# Patient Record
Sex: FEMALE | URBAN - METROPOLITAN AREA
[De-identification: names, ages, dates, MRNs, and addresses within clinical notes are randomized per-mention and may not be internally consistent; named-entity substitution may affect disease eponyms.]

---

## 2017-02-07 ENCOUNTER — IMPORTED ENCOUNTER (OUTPATIENT)
Dept: URBAN - METROPOLITAN AREA CLINIC 1 | Facility: CLINIC | Age: 82
End: 2017-02-07

## 2017-02-07 PROBLEM — H20.9: Noted: 2017-02-07

## 2017-02-07 PROCEDURE — 92012 INTRM OPH EXAM EST PATIENT: CPT

## 2017-02-07 NOTE — PATIENT DISCUSSION
1. Idiopathic Uveitis recurrent but improved on current meds: No previous systemic testing performed. Patient has seen Dr. Karoline Montiel in the past but never followed back up. D/c Latanoprost which could exacerbate inflammatory component begin Pred Forte Qdaily OD and begin Alphagan P BID OU. (rx sent to patient's Pharmacy)  2. Open Angle Glaucoma OU -(Adv OS/ Mod OD) (CD.5 OD/.75 OS) Will d/c Latanoprost and begin Alphagan P BID OU. IOP in better control than last visit. Return for an appointment in 3 week 10 dfe with Dr. Elyssa Guadalupe.

## 2017-02-27 ENCOUNTER — IMPORTED ENCOUNTER (OUTPATIENT)
Dept: URBAN - METROPOLITAN AREA CLINIC 1 | Facility: CLINIC | Age: 82
End: 2017-02-27

## 2017-02-27 PROBLEM — H40.1123: Noted: 2017-02-27

## 2017-02-27 PROBLEM — H40.1112: Noted: 2017-02-27

## 2017-02-27 PROCEDURE — 92012 INTRM OPH EXAM EST PATIENT: CPT

## 2017-02-27 NOTE — PATIENT DISCUSSION
1.  Open Angle Glaucoma OU (Adv OS/ Mod OD) (CD.5 OD/.75 OS)- Improved IOP OU on new regiment. Continue Alphagan P BID OU. 2.  Idiopathic Uveitis: Currently quiet. No previous systemic testing performed. Patient has seen Dr. Deni Glasgow in the past but never followed back up. Continue Pred Forte Qdaily OD and Alphagan P BID OU. 3.  Return in 6 months for a 30 w/ PMG.

## 2018-12-28 ENCOUNTER — IMPORTED ENCOUNTER (OUTPATIENT)
Dept: URBAN - METROPOLITAN AREA CLINIC 1 | Facility: CLINIC | Age: 83
End: 2018-12-28

## 2018-12-28 PROBLEM — H40.1112: Noted: 2018-12-28

## 2018-12-28 PROBLEM — H40.1123: Noted: 2018-12-28

## 2018-12-28 PROBLEM — H43.812: Noted: 2018-12-28

## 2018-12-28 PROBLEM — G45.9: Noted: 2018-12-28

## 2018-12-28 PROCEDURE — 92015 DETERMINE REFRACTIVE STATE: CPT

## 2018-12-28 PROCEDURE — 92014 COMPRE OPH EXAM EST PT 1/>: CPT

## 2018-12-28 NOTE — PATIENT DISCUSSION
1.  Transient Ischemic Attack - vision improving today per patient. Possibly related to elevation in IOP will restart Alphagan. Today's MRX corrects VA to 20/25 OU. 2.  Moderate Open Angle Glaucoma OD/Advanced OS (CD 0.60/0.75) - IOP elevated due to non compliance. Restart Alphagan P BID OU. Patient advised to be compliant with gtts. Condition was discussed with patient and patient understands. Will continue to monitor patient for any progression in condition. Patient was advised to call us with any problems questions or concerns. 3.  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 4.  Idiopathic Uveitis: Currently quiet. No previous systemic testing performed. Currently not taking Pred Forte QD OD. Will hold on restarting and observe off Pred ForteMRX for glasses givenReturn for an appointment in 3 weeks 10 (IOP check) with Dr. Rachelle Yadav.

## 2018-12-28 NOTE — PATIENT DISCUSSION
Severe Open Angle Glaucoma OS -Patient to continue with current gtt regimen. Patient advised to be compliant with gtts. Condition was discussed with patient and patient understands. Will continue to monitor patient for any progression in condition. Patient was advised to call us with any problems questions or concerns.

## 2018-12-28 NOTE — PATIENT DISCUSSION
Moderate Open Angle Glaucoma OD/Advanced OS (CD 0.60/0.75) - IOPP elevated due to non compliance. Restart Alphagan P BID OU. Patient advised to be compliant with gtts. Condition was discussed with patient and patient understands. Will continue to monitor patient for any progression in condition. Patient was advised to call us with any problems questions or concerns.

## 2019-01-22 ENCOUNTER — IMPORTED ENCOUNTER (OUTPATIENT)
Dept: URBAN - METROPOLITAN AREA CLINIC 1 | Facility: CLINIC | Age: 84
End: 2019-01-22

## 2019-01-22 PROBLEM — H40.1112: Noted: 2019-01-22

## 2019-01-22 PROBLEM — H40.1123: Noted: 2019-01-22

## 2019-01-22 PROCEDURE — 92012 INTRM OPH EXAM EST PATIENT: CPT

## 2019-01-22 NOTE — PATIENT DISCUSSION
1.  Moderate Open Angle Glaucoma OD/Advanced COAG OS (CD 0.60/0.75) - IOP improved today with compliance. Cont Alphagan P BID OU. Compliance discussed with patient. 2.  H/o TIA  3. H/o PVD OS 4. H/o Idiopathic Uveitis: Currently quiet. No previous systemic testing performed. Currently not taking Pred Forte QD OD.  Will hold on restarting and observe off Pred Forte

## 2022-04-02 ASSESSMENT — TONOMETRY
OS_IOP_MMHG: 22
OD_IOP_MMHG: 26
OD_IOP_MMHG: 18
OD_IOP_MMHG: 18
OS_IOP_MMHG: 19
OD_IOP_MMHG: 16
OS_IOP_MMHG: 16
OS_IOP_MMHG: 27

## 2022-04-02 ASSESSMENT — VISUAL ACUITY
OD_SC: 20/30
OS_SC: 20/50-1
OS_SC: 20/20-1
OD_SC: 20/30-2
OD_SC: 20/20-1
OD_SC: 20/25
OS_SC: 20/20
OS_SC: 20/25